# Patient Record
Sex: FEMALE | Race: WHITE | Employment: UNEMPLOYED | URBAN - METROPOLITAN AREA
[De-identification: names, ages, dates, MRNs, and addresses within clinical notes are randomized per-mention and may not be internally consistent; named-entity substitution may affect disease eponyms.]

---

## 2022-09-09 ENCOUNTER — OFFICE VISIT (OUTPATIENT)
Dept: ORTHOPEDIC SURGERY | Age: 72
End: 2022-09-09
Payer: MEDICARE

## 2022-09-09 DIAGNOSIS — M17.11 PRIMARY OSTEOARTHRITIS OF RIGHT KNEE: ICD-10-CM

## 2022-09-09 DIAGNOSIS — M25.561 ACUTE PAIN OF RIGHT KNEE: Primary | ICD-10-CM

## 2022-09-09 PROCEDURE — 99204 OFFICE O/P NEW MOD 45 MIN: CPT | Performed by: PHYSICIAN ASSISTANT

## 2022-09-09 PROCEDURE — 1123F ACP DISCUSS/DSCN MKR DOCD: CPT | Performed by: PHYSICIAN ASSISTANT

## 2022-09-09 PROCEDURE — 20610 DRAIN/INJ JOINT/BURSA W/O US: CPT | Performed by: PHYSICIAN ASSISTANT

## 2022-09-09 RX ORDER — OMEPRAZOLE 20 MG/1
20 CAPSULE, DELAYED RELEASE ORAL PRN
COMMUNITY

## 2022-09-09 RX ORDER — BETAMETHASONE SODIUM PHOSPHATE AND BETAMETHASONE ACETATE 3; 3 MG/ML; MG/ML
12 INJECTION, SUSPENSION INTRA-ARTICULAR; INTRALESIONAL; INTRAMUSCULAR; SOFT TISSUE ONCE
Status: COMPLETED | OUTPATIENT
Start: 2022-09-09 | End: 2022-09-09

## 2022-09-09 RX ORDER — TIMOLOL MALEATE 5 MG/ML
1 SOLUTION/ DROPS OPHTHALMIC DAILY
COMMUNITY

## 2022-09-09 RX ORDER — ACETAMINOPHEN 325 MG/1
650 TABLET ORAL EVERY 6 HOURS PRN
COMMUNITY

## 2022-09-09 RX ORDER — LOSARTAN POTASSIUM AND HYDROCHLOROTHIAZIDE 25; 100 MG/1; MG/1
TABLET ORAL
COMMUNITY
Start: 2022-09-06

## 2022-09-09 RX ORDER — CAPSAICIN 0.025 %
CREAM (GRAM) TOPICAL 2 TIMES DAILY
COMMUNITY
Start: 2022-08-31

## 2022-09-09 RX ORDER — PRAVASTATIN SODIUM 40 MG
TABLET ORAL
COMMUNITY
Start: 2022-07-27

## 2022-09-09 RX ORDER — PRAVASTATIN SODIUM 40 MG
TABLET ORAL
COMMUNITY
Start: 2022-01-31

## 2022-09-09 RX ORDER — CELECOXIB 200 MG/1
200 CAPSULE ORAL 2 TIMES DAILY
COMMUNITY
Start: 2022-07-20

## 2022-09-09 RX ORDER — AMLODIPINE BESYLATE 5 MG/1
TABLET ORAL
COMMUNITY
Start: 2022-09-06

## 2022-09-09 RX ORDER — BUPIVACAINE HYDROCHLORIDE 5 MG/ML
2 INJECTION, SOLUTION PERINEURAL ONCE
Status: COMPLETED | OUTPATIENT
Start: 2022-09-09 | End: 2022-09-09

## 2022-09-09 RX ORDER — LIDOCAINE HYDROCHLORIDE 10 MG/ML
2 INJECTION, SOLUTION INFILTRATION; PERINEURAL ONCE
Status: COMPLETED | OUTPATIENT
Start: 2022-09-09 | End: 2022-09-09

## 2022-09-09 RX ORDER — ATENOLOL 50 MG/1
TABLET ORAL
COMMUNITY
Start: 2022-01-31

## 2022-09-09 RX ADMIN — BUPIVACAINE HYDROCHLORIDE 10 MG: 5 INJECTION, SOLUTION PERINEURAL at 14:00

## 2022-09-09 RX ADMIN — BETAMETHASONE SODIUM PHOSPHATE AND BETAMETHASONE ACETATE 12 MG: 3; 3 INJECTION, SUSPENSION INTRA-ARTICULAR; INTRALESIONAL; INTRAMUSCULAR; SOFT TISSUE at 13:59

## 2022-09-09 RX ADMIN — LIDOCAINE HYDROCHLORIDE 2 ML: 10 INJECTION, SOLUTION INFILTRATION; PERINEURAL at 14:01

## 2022-09-09 NOTE — PROGRESS NOTES
[facility], [address] [place] [address], [address] [place] [address], [id number] [place] [address]           Dept Phone: [phone]           Dept Fax:  [phone] [address] [facility], [place]          Dept Phone: [phone]           Dept Fax:  [phone]      Chief Compliant:  Chief Complaint   Patient presents with    Established New Doctor    Pain     Bilateral knee        History of Present Illness: This is a 67 y.o. female who presents to the clinic today for evaluation of had concerns including Established New Doctor and Pain (Bilateral knee). Ms. Abigail Zamora is a 31-year-old female who presents for evaluation of 3 to 4-week history of right knee pain. Patient does not recall any specific injury or trauma but does note that she had some left knee pain prior to the onset of her right knee pain which has since resolved. Patient reports pain is most severe to the anterior medial knee seems to be aggravated by walking and relieved by ice. Patient reports she is very active avid golfer does a lot of water aerobics and swimming she has been unable to do secondary to the pain in the knee. Patient denies any joint warmth, redness, fever or chills but does note some swelling. Patient has tried prednisone, Mobic and Celebrex per PCP. She does believe the Celebrex is providing mild relief of pain but she does continue to have some moderate pain occasionally with activity.        Past History:    Current Outpatient Medications:     acetaminophen (TYLENOL) 325 MG tablet, Take 650 mg by mouth every 6 hours as needed, Disp: , Rfl:     amLODIPine (NORVASC) 5 MG tablet, , Disp: , Rfl:     atenolol (TENORMIN) 50 MG tablet, TAKE 1 TABLET DAILY, Disp: , Rfl:     capsaicin (ZOSTRIX) 0.025 % cream, Apply topically 2 times daily, Disp: , Rfl:     celecoxib (CELEBREX) 200 MG capsule, Take 200 mg by mouth 2 times daily, Disp: , Rfl:     vitamin D (CHOLECALCIFEROL) 25 MCG (1000 UT) TABS tablet, Take 1,000 Units by mouth daily, Disp: , Rfl:     cyanocobalamin 1000 MCG tablet, Take 1,000 mcg by mouth daily, Disp: , Rfl:     losartan-hydroCHLOROthiazide (HYZAAR) 100-25 MG per tablet, , Disp: , Rfl:     pravastatin (PRAVACHOL) 40 MG tablet, , Disp: , Rfl:     pravastatin (PRAVACHOL) 40 MG tablet, TAKE 1 TABLET DAILY, Disp: , Rfl:     timolol (TIMOPTIC) 0.5 % ophthalmic solution, 1 drop daily, Disp: , Rfl:     sertraline (ZOLOFT) 50 MG tablet, , Disp: , Rfl:     omeprazole (PRILOSEC) 20 MG delayed release capsule, Take 20 mg by mouth as needed, Disp: , Rfl:   No Known Allergies  Social History     Socioeconomic History    Marital status:      Spouse name: Not on file    Number of children: Not on file    Years of education: Not on file    Highest education level: Not on file   Occupational History    Not on file   Tobacco Use    Smoking status: Not on file    Smokeless tobacco: Not on file   Substance and Sexual Activity    Alcohol use: Not on file    Drug use: Not on file    Sexual activity: Not on file   Other Topics Concern    Not on file   Social History Narrative    Not on file     Social Determinants of Health     Financial Resource Strain: Not on file   Food Insecurity: Not on file   Transportation Needs: Not on file   Physical Activity: Not on file   Stress: Not on file   Social Connections: Not on file   Intimate Partner Violence: Not on file   Housing Stability: Not on file     No past medical history on file. No past surgical history on file. No family history on file. Review of Systems   Constitutional: Negative for fever, chills, sweats. Eyes: Negative for changes in vision, or pain. HENT: Negative for ear ache, epistaxis, or sore throat. Respiratory/Cardio: Negative for Chest pain, palpitations, SOB, or cough. Gastrointestinal: Negative for abdominal pain, N/V/D. Genitourinary: Negative for dysuria, frequency, urgency, or hematuria. Neurological: Negative for headache, numbness, or weakness. Integumentary: Negative for rash, itching, laceration, or abrasion. Musculoskeletal: Positive for Established New Doctor and Pain (Bilateral knee)       Physical Exam:  Constitutional: Patient is oriented to person, place, and time. Patient appears well-developed and well nourished. HENT: Negative otherwise noted  Head: Normocephalic and Atraumatic  Nose: Normal  Eyes: Conjunctivae and EOM are normal  Neck: Normal range of motion Neck supple. Respiratory/Cardio: Effort normal. No respiratory distress. Musculoskeletal:    Right Knee:     Skin: warm and dry, no rash or erythema  Vasculature: 2+ pedal pulses bilaterally  Neuro: Sensation grossly intact to light touch diffusely  Alignment: Normal  Tenderness: Mild medial joint line. No tenderness to quad/patellar tendon, pes anserine bursa or posterior knee. Effusion: None    ROM: (Degrees)       A P       Extension  0 0       Flexion   115 125       Crepitation  Yes       Muscle strength:         Flexion   5      Extension  5      SLR   5        Extensor lag   n          Special testing:  y    Pain with deep knee flexion     y    Patellar grind       n    Patellar apprehension      n    Patellar glide         n    Lachman       n    Anterior drawer      n    Pivot shift       n    Posterior drawer      n    Dial test       n    Posterolateral drawer      n    Posterior Sag       n    MCL        n    LCL          mild    Medial joint line tenderness     n    Lateral joint line tenderness     n    Appley's           Neurological: Patient is alert and oriented to person, place, and time. Normal strenght. No sensory deficit. Skin: Skin is warm and dry  Psychiatric: Behavior is normal. Thought content normal.  Nursing note and vitals reviewed. Labs and Imaging:     No image results found.         X-rays taken in clinic today and preliminarily reviewed by me 9/9/22:  3 views of the right knee demonstrate mild degenerative changes of the medial and patellofemoral compartment. No evidence of significant suprapatellar joint effusion. No acute fracture, subluxation or dislocation. Orders Placed This Encounter   Procedures    58343 - DRAIN/INJECT LARGE JOINT BURSA       Assessment and Plan:  1. Acute pain of right knee    2. Primary osteoarthritis of right knee          PLAN:  Remi Mayo is a 67 y.o. old female with mild right knee osteoarthritis. Patient is given reassurance that there is no evidence of ligamentous or meniscal pathology based on examination today. Pain likely due to an arthritic flareup. I had a discussion with the patient with regards to the nature and extent of her problem. We also discussed treatment options available to her including non-operative and operative intervention. To this end we discussed use of NSAIDs, cortisone and viscosupplementation injections, weight loss, activity modification, physical therapy, bracing, and use of assistive walking devices. We also had discussions about total knee arthroplasties. As outlined above she has attempted treatment to include use of various prescription NSAIDs and oral steroid. At this time she would like to proceed with corticosteroid injection  Celestone injection given as outlined below  Patient tolerated injection well educated on postinjection care  Follow-up in 3 months however patient may call or consider for any questions or concerns    Procedure Note: Right knee Celestone Injection   An informed verbal consent for the procedure was obtained and risks including, but not limited to: allergy to medications, injection, bleeding, stiffness of joint, recurrence of symptoms, loss of function, swelling, drainage, irrigation, need for surgery and pseudo-septic inflammation, were explained to the patient.  Also, discussed was the potential for further injections, irrigation and debridement and surgery. Alternate means of treatment have also been discussed with the patient. Following an appropriate discussion with the patient regarding the risks and benefits of the procedure she consented to proceed. her right knee was prepped using betadine solution and alcohol swab. Using aseptic technique and through a superolateral approach, her right knee was injected superficially with 4 cc mixture of 2 cc Lidocaine without epi and 2 cc of Marcaine and subsequently with 2 cc of 6 mg/mL Celestone into the right knee. A band aid was applied to the injection site. she tolerated the injection with no immediate adverse reactions. Electronically signed by JANET Monaco on 9/9/22 at 1:41 PM EDT        Please note that this chart was generated using voice recognition Dragon dictation software. Although every effort was made to ensure the accuracy of this automated transcription, some errors in transcription may have occurred.